# Patient Record
Sex: FEMALE | Race: WHITE | NOT HISPANIC OR LATINO | Employment: OTHER | URBAN - METROPOLITAN AREA
[De-identification: names, ages, dates, MRNs, and addresses within clinical notes are randomized per-mention and may not be internally consistent; named-entity substitution may affect disease eponyms.]

---

## 2019-08-27 ENCOUNTER — OFFICE VISIT (OUTPATIENT)
Dept: CARDIOLOGY | Facility: CLINIC | Age: 84
End: 2019-08-27

## 2019-08-27 VITALS
HEART RATE: 60 BPM | BODY MASS INDEX: 21.05 KG/M2 | WEIGHT: 131 LBS | RESPIRATION RATE: 16 BRPM | SYSTOLIC BLOOD PRESSURE: 142 MMHG | HEIGHT: 66 IN | DIASTOLIC BLOOD PRESSURE: 60 MMHG

## 2019-08-27 DIAGNOSIS — J44.9 CHRONIC OBSTRUCTIVE PULMONARY DISEASE, UNSPECIFIED COPD TYPE (HCC): ICD-10-CM

## 2019-08-27 DIAGNOSIS — I10 ESSENTIAL HYPERTENSION: ICD-10-CM

## 2019-08-27 DIAGNOSIS — E78.5 HYPERLIPIDEMIA, UNSPECIFIED HYPERLIPIDEMIA TYPE: ICD-10-CM

## 2019-08-27 DIAGNOSIS — I48.91 ATRIAL FIBRILLATION, UNSPECIFIED TYPE (HCC): Primary | ICD-10-CM

## 2019-08-27 PROCEDURE — 99203 OFFICE O/P NEW LOW 30 MIN: CPT | Performed by: INTERNAL MEDICINE

## 2019-08-27 PROCEDURE — 93000 ELECTROCARDIOGRAM COMPLETE: CPT | Performed by: INTERNAL MEDICINE

## 2019-08-28 NOTE — PROGRESS NOTES
[facility]        Subjective:     Encounter Date:08/27/2019      Patient ID: Tamar Steve is a 87 y.o. female.    HPI:  I saw Tamar Blevins today for cardiovascular evaluation.  She is a pleasant [id number]-[id number] female who does not report a prior history of cardiac disease.   She had maintained a good activity level until she fell, fracturing her pelvis in 2017.  This was followed by pneumonia November 2018 for which she was hospitalized 5 days.  Both of these events have caused her to decrease her activity level.  During her hospitalization for pneumonia she developed atrial fibrillation.  She has been placed on beta-blocker therapy for blood pressure and rate control and anticoagulation to reduce her stroke risk.  She is tolerating her medications well no significant bleeding or bruising.  Echocardiogram was obtained 3/1/2019 which revealed left ventricular ejection fraction 56%, mild concentric left ventricular hypertrophy mild mitral and tricuspid insufficiency right ventricular systolic pressure 40 mmHg.    She is gradually advancing her activity level and remains free of any chest pain pressure or tightness.  She has her baseline dyspnea on exertion but this is not progressive and she is free of orthopnea or PND sleeping with one pillow.  She has no lower extremity edema and remains free of syncope or near syncope.  She specifically denies any palpitations or symptoms from atrial fibrillation.  She continues to tolerate her medications well no reported side effects.        The following portions of the patient's history were reviewed and updated as appropriate: allergies, current medications, past family history, past medical history, past social history, past surgical history and problem list.    Problem List:  Patient Active Problem List   Diagnosis   • Atrial fibrillation (CMS/HCC)   • Hypertension   • Hyperlipidemia   • Hypothyroidism (acquired)   • COPD (chronic obstructive pulmonary  disease) (CMS/[facility])   • History of echocardiogram       Past Medical History:  Past Medical History:   Diagnosis Date   • Atrial fibrillation (CMS/[facility])    • COPD (chronic obstructive pulmonary disease) (CMS/[facility])    • History of echocardiogram     11/30/18 - Mild concentric LVH. Fibrocalcific sclerosis of AV without evidence of AS. Mild MR, TR. RV systolic pressure consistent with mild PHTN. Fat pad vs trivial/small pericardial effusion present.   • Hyperlipidemia    • Hypertension    • Hypothyroidism (acquired)        Past Surgical History:  Past Surgical History:   Procedure Laterality Date   • APPENDECTOMY         Social History:  Social History     Socioeconomic History   • Marital status:      Spouse name: Not on file   • Number of children: Not on file   • Years of education: Not on file   • Highest education level: Not on file   Tobacco Use   • Smoking status: Former Smoker     Packs/day: 0.25     Years: 55.00     Pack years: 13.75     Types: Cigarettes     Last attempt to quit: 4/27/2009     Years since quitting: 10.3       Allergies:  Allergies   Allergen Reactions   • Levaquin [Levofloxacin] Other (See Comments)     Broke out within mouth   • Penicillins Unknown (See Comments)     .         ROS:  Review of Systems   Constitution: Negative for weakness and malaise/fatigue.   HENT: Negative for hearing loss and nosebleeds.    Eyes: Negative for double vision and visual disturbance.   Cardiovascular: Negative for chest pain, claudication, dyspnea on exertion, near-syncope, orthopnea, palpitations, paroxysmal nocturnal dyspnea and syncope.   Respiratory: Negative for cough, shortness of breath, sleep disturbances due to breathing, snoring, sputum production and wheezing.    Endocrine: Negative for cold intolerance, heat intolerance, polydipsia and polyuria.   Hematologic/Lymphatic: Negative for adenopathy and bleeding problem. Does not bruise/bleed easily.   Skin: Negative for flushing and itching.  "  Musculoskeletal: Negative for back pain, falls, joint pain, joint swelling, muscle weakness and neck pain.   Gastrointestinal: Negative for abdominal pain, dysphagia, heartburn, nausea and vomiting.   Genitourinary: Negative for dysuria and frequency.   Neurological: Negative for disturbances in coordination, dizziness, light-headedness, loss of balance and numbness.   Psychiatric/Behavioral: Negative for altered mental status and memory loss. The patient is not nervous/anxious.    Allergic/Immunologic: Negative for hives and persistent infections.          Objective:         /60 (BP Location: Left arm, Patient Position: Sitting)   Pulse 60   Resp 16   Ht 166.4 cm (65.5\")   Wt 59.4 kg (131 lb)   BMI 21.47 kg/m²     Physical Exam   Constitutional: She is oriented to person, place, and time. She appears well-developed and well-nourished.   HENT:   Head: Normocephalic and atraumatic.   Eyes: Conjunctivae and EOM are normal. Pupils are equal, round, and reactive to light.   Neck: Neck supple. No thyromegaly present.   Cardiovascular: Normal rate, regular rhythm, S1 normal, S2 normal, normal heart sounds and intact distal pulses. PMI is not displaced. Exam reveals no gallop, no S3, no S4, no distant heart sounds, no friction rub, no midsystolic click and no opening snap.   No murmur heard.  Pulses:       Carotid pulses are 2+ on the right side, and 2+ on the left side.       Radial pulses are 2+ on the right side, and 2+ on the left side.        Femoral pulses are 2+ on the right side, and 2+ on the left side.       Dorsalis pedis pulses are 2+ on the right side, and 2+ on the left side.        Posterior tibial pulses are 2+ on the right side, and 2+ on the left side.   Pulmonary/Chest: Effort normal and breath sounds normal. No respiratory distress. She has no wheezes. She has no rales.   Abdominal: Soft. Bowel sounds are normal. She exhibits no distension and no mass. There is no tenderness. "   Musculoskeletal: Normal range of motion. She exhibits no edema.   Neurological: She is alert and oriented to person, place, and time.   Skin: Skin is warm and dry. No erythema.   Psychiatric: She has a normal mood and affect.       In-Office Procedure(s):    ECG 12 Lead  Date/Time: 8/27/2019 10:08 PM  Performed by: Boby Cabrera MD  Authorized by: Boby Cabrera MD   Comparison: not compared with previous ECG   Previous ECG: no previous ECG available  Rhythm: sinus bradycardia  BPM: 52  Other findings: non-specific ST-T wave changes            ASCVD RIsk Score::  The ASCVD Risk score (Prudence DC Jr., et al., 2013) failed to calculate for the following reasons:    The 2013 ASCVD risk score is only valid for ages 40 to 79    Recent Radiology:  Imaging Results (most recent)     None                Assessment/Plan:         1. Atrial fibrillation, unspecified type (CMS/HCC)  Converted to sinus rhythm, well-tolerated long-term anticoagulation  - ECG 12 Lead    2. Essential hypertension  Controlled.      3. Hyperlipidemia, unspecified hyperlipidemia type      4. Chronic obstructive pulmonary disease, unspecified COPD type (CMS/HCC)  60 years of tobacco use discontinued 10 years ago    Ms. Steve is stable and asymptomatic from a cardiovascular standpoint.  She is free of angina euvolemic and advancing her activity level she has been asymptomatic for atrial fibrillation and tolerates her long-term anticoagulation well.  I made no changes in her medications I will plan to see her in follow-up in 3 months but of course sooner if needed.           Boby Cabrera MD  08/27/19  .

## 2019-12-03 PROBLEM — Z79.01 LONG TERM CURRENT USE OF ANTICOAGULANT THERAPY: Status: ACTIVE | Noted: 2019-12-03

## 2019-12-04 ENCOUNTER — OFFICE VISIT (OUTPATIENT)
Dept: CARDIOLOGY | Facility: CLINIC | Age: 84
End: 2019-12-04

## 2019-12-04 VITALS
DIASTOLIC BLOOD PRESSURE: 70 MMHG | BODY MASS INDEX: 21.53 KG/M2 | WEIGHT: 134 LBS | HEART RATE: 60 BPM | SYSTOLIC BLOOD PRESSURE: 124 MMHG | HEIGHT: 66 IN

## 2019-12-04 DIAGNOSIS — I10 ESSENTIAL HYPERTENSION: ICD-10-CM

## 2019-12-04 DIAGNOSIS — I48.91 ATRIAL FIBRILLATION, UNSPECIFIED TYPE (HCC): Primary | ICD-10-CM

## 2019-12-04 DIAGNOSIS — Z79.01 LONG TERM CURRENT USE OF ANTICOAGULANT THERAPY: ICD-10-CM

## 2019-12-04 DIAGNOSIS — E78.5 HYPERLIPIDEMIA, UNSPECIFIED HYPERLIPIDEMIA TYPE: ICD-10-CM

## 2019-12-04 PROCEDURE — 99213 OFFICE O/P EST LOW 20 MIN: CPT | Performed by: INTERNAL MEDICINE

## 2019-12-04 RX ORDER — LANOLIN ALCOHOL/MO/W.PET/CERES
2000 CREAM (GRAM) TOPICAL DAILY
COMMUNITY

## 2019-12-08 NOTE — PROGRESS NOTES
[facility]        Subjective:     Encounter Date:12/04/2019      Patient ID: Tamar Steve is a 87 y.o. female.      HPI: I saw Tamar Zheng today for cardiovascular care.  She is an 87-year-old female who reports dyspnea on exertion and periods of rapid heart rate.  She has known paroxysmal atrial fibrillation and is on long-term anticoagulation.  She tolerates her anticoagulation well no reported bruising or bleeding.  She states that her rapid heart rate is infrequent but does make her anxious.  She does not experience chest pain pressure tightness.  She is free of orthopnea or PND and no lower extremity edema.  She denies humaira syncope or near syncope.    Echocardiogram from 3/1/2019 revealed preserved left ventricular function, mild concentric left ventricular hypertrophy, mild mitral and tricuspid insufficiency, right ventricular systolic pressure 40mmHg.      The following portions of the patient's history were reviewed and updated as appropriate: allergies, current medications, past family history, past medical history, past social history, past surgical history and problem list.    Problem List:  Patient Active Problem List   Diagnosis   • Atrial fibrillation (CMS/[facility])   • Hypertension   • Hyperlipidemia   • Hypothyroidism (acquired)   • COPD (chronic obstructive pulmonary disease) (CMS/[facility])   • History of echocardiogram   • Long term current use of anticoagulant therapy       Past Medical History:  Past Medical History:   Diagnosis Date   • Atrial fibrillation (CMS/HCC)    • COPD (chronic obstructive pulmonary disease) (CMS/[facility])    • History of echocardiogram     11/30/18 - Mild concentric LVH. Fibrocalcific sclerosis of AV without evidence of AS. Mild MR, TR. RV systolic pressure consistent with mild PHTN. Fat pad vs trivial/small pericardial effusion present.   • Hyperlipidemia    • Hypertension    • Hypothyroidism (acquired)        Past Surgical History:  Past Surgical History:    Procedure Laterality Date   • APPENDECTOMY         Social History:  Social History     Socioeconomic History   • Marital status:      Spouse name: Not on file   • Number of children: Not on file   • Years of education: Not on file   • Highest education level: Not on file   Tobacco Use   • Smoking status: Former Smoker     Packs/day: 0.25     Years: 55.00     Pack years: 13.75     Types: Cigarettes     Last attempt to quit: 4/27/2009     Years since quitting: 10.6       Allergies:  Allergies   Allergen Reactions   • Levaquin [Levofloxacin] Other (See Comments)     Broke out within mouth   • Penicillins Rash     .         ROS:  Review of Systems   Constitution: Negative for malaise/fatigue.   HENT: Negative for hearing loss and nosebleeds.    Eyes: Negative for double vision and visual disturbance.   Cardiovascular: Positive for dyspnea on exertion and palpitations. Negative for chest pain, claudication, near-syncope, orthopnea, paroxysmal nocturnal dyspnea and syncope.   Respiratory: Negative for cough, shortness of breath, sleep disturbances due to breathing, snoring, sputum production and wheezing.    Endocrine: Negative for cold intolerance, heat intolerance, polydipsia and polyuria.   Hematologic/Lymphatic: Negative for adenopathy and bleeding problem. Does not bruise/bleed easily.   Skin: Negative for flushing and itching.   Musculoskeletal: Negative for back pain, falls, joint pain, joint swelling, muscle weakness and neck pain.   Gastrointestinal: Negative for abdominal pain, dysphagia, heartburn, nausea and vomiting.   Genitourinary: Negative for dysuria and frequency.   Neurological: Negative for disturbances in coordination, dizziness, light-headedness, loss of balance, numbness and weakness.   Psychiatric/Behavioral: Negative for altered mental status and memory loss. The patient is not nervous/anxious.    Allergic/Immunologic: Negative for hives and persistent infections.          Objective:      "    /70   Pulse 60   Ht 166.4 cm (65.5\")   Wt 60.8 kg (134 lb)   BMI 21.96 kg/m²     Physical Exam   Constitutional: She is oriented to person, place, and time. She appears well-developed and well-nourished.   HENT:   Head: Normocephalic and atraumatic.   Eyes: Pupils are equal, round, and reactive to light. Conjunctivae and EOM are normal.   Neck: Neck supple. No thyromegaly present.   Cardiovascular: Normal rate, regular rhythm, S1 normal, S2 normal and intact distal pulses. PMI is not displaced. Exam reveals gallop and S4. Exam reveals no S3, no distant heart sounds, no friction rub, no midsystolic click and no opening snap.   No murmur heard.  Pulses:       Carotid pulses are 2+ on the right side, and 2+ on the left side.       Radial pulses are 2+ on the right side, and 2+ on the left side.        Femoral pulses are 2+ on the right side, and 2+ on the left side.       Dorsalis pedis pulses are 2+ on the right side, and 2+ on the left side.        Posterior tibial pulses are 2+ on the right side, and 2+ on the left side.   Pulmonary/Chest: Effort normal and breath sounds normal. No respiratory distress. She has no wheezes. She has no rales.   Abdominal: Soft. Bowel sounds are normal. She exhibits no distension and no mass. There is no tenderness.   Musculoskeletal: Normal range of motion. She exhibits no edema.   Neurological: She is alert and oriented to person, place, and time.   Skin: Skin is warm and dry. No erythema.   Psychiatric: She has a normal mood and affect.       In-Office Procedure(s):  Procedures    ASCVD RIsk Score::  The ASCVD Risk score (Prudence LINDA Jr., et al., 2013) failed to calculate for the following reasons:    The 2013 ASCVD risk score is only valid for ages 40 to 79        Assessment/Plan:         1. Atrial fibrillation, unspecified type (CMS/HCC)  Relatively stable with controlled ventricular response    2. Long term current use of anticoagulant therapy  Well-tolerated    3. " Essential hypertension  Controlled    4. Hyperlipidemia, unspecified hyperlipidemia type  Controlled    Tamar is free of angina euvolemic and relatively active.  She is tolerating her anticoagulation without significant bruising or bleeding.  I encouraged her to maintain her activity level and observe a low-cholesterol low saturated fat diet and made no changes in her medications.       Boby Cabrera MD  12/08/19  .

## 2020-06-05 ENCOUNTER — TELEMEDICINE (OUTPATIENT)
Dept: CARDIOLOGY | Facility: CLINIC | Age: 85
End: 2020-06-05

## 2020-06-05 VITALS — WEIGHT: 130 LBS | BODY MASS INDEX: 21.66 KG/M2 | HEIGHT: 65 IN

## 2020-06-05 DIAGNOSIS — I10 ESSENTIAL HYPERTENSION: ICD-10-CM

## 2020-06-05 DIAGNOSIS — E78.5 HYPERLIPIDEMIA, UNSPECIFIED HYPERLIPIDEMIA TYPE: ICD-10-CM

## 2020-06-05 DIAGNOSIS — I48.91 ATRIAL FIBRILLATION, UNSPECIFIED TYPE (HCC): Primary | ICD-10-CM

## 2020-06-05 DIAGNOSIS — J44.9 CHRONIC OBSTRUCTIVE PULMONARY DISEASE, UNSPECIFIED COPD TYPE (HCC): ICD-10-CM

## 2020-06-05 DIAGNOSIS — Z79.01 LONG TERM CURRENT USE OF ANTICOAGULANT THERAPY: ICD-10-CM

## 2020-06-05 PROCEDURE — 99442 PR PHYS/QHP TELEPHONE EVALUATION 11-20 MIN: CPT | Performed by: INTERNAL MEDICINE

## 2020-06-05 RX ORDER — IPRATROPIUM BROMIDE 21 UG/1
2 SPRAY, METERED NASAL 4 TIMES DAILY
COMMUNITY

## 2020-06-05 NOTE — PROGRESS NOTES
[facility]    You have chosen to receive care through a telephone visit. Do you consent to use a telephone visit for your medical care today? Yes    Subjective:     Encounter Date:06/05/2020      Patient ID: Tamar Steve is a 87 y.o. female.      HPI: Tamar Steve agreed to a telephone clinical visit today secondary to the coronavirus pandemic.  She is been observing the CDC guidelines for social distancing.  She remains free of fever cough or increased shortness of breath.  She does not report any change in her sense of smell or taste.  Tamar does not report any chest pain pressure or tightness.  She has her baseline dyspnea on exertion but this is not progressive.  She does not experience orthopnea or PND.  She does not report any lower extremity edema.  She denies syncope or palpitation but does report recurrent orthostatic dizziness.  Overall she tolerates her medications well including her long-term anticoagulation for stroke risk reduction secondary to atrial fibrillation.      The following portions of the patient's history were reviewed and updated as appropriate: allergies, current medications, past family history, past medical history, past social history, past surgical history and problem list.    Problem List:  Patient Active Problem List   Diagnosis   • Atrial fibrillation (CMS/[facility])   • Hypertension   • Hyperlipidemia   • Hypothyroidism (acquired)   • COPD (chronic obstructive pulmonary disease) (CMS/[facility])   • History of echocardiogram   • Long term current use of anticoagulant therapy       Past Medical History:  Past Medical History:   Diagnosis Date   • Atrial fibrillation (CMS/HCC)    • COPD (chronic obstructive pulmonary disease) (CMS/[facility])    • History of echocardiogram     11/30/18 - Mild concentric LVH. Fibrocalcific sclerosis of AV without evidence of AS. Mild MR, TR. RV systolic pressure consistent with mild PHTN. Fat pad vs trivial/small pericardial effusion present.    • Hyperlipidemia    • Hypertension    • Hypothyroidism (acquired)        Past Surgical History:  Past Surgical History:   Procedure Laterality Date   • APPENDECTOMY         Social History:  Social History     Socioeconomic History   • Marital status:      Spouse name: Not on file   • Number of children: Not on file   • Years of education: Not on file   • Highest education level: Not on file   Tobacco Use   • Smoking status: Former Smoker     Packs/day: 0.25     Years: 55.00     Pack years: 13.75     Types: Cigarettes     Last attempt to quit: 2009     Years since quittin.1   • Smokeless tobacco: Never Used   Substance and Sexual Activity   • Alcohol use: Never     Frequency: Never   • Drug use: Never   • Sexual activity: Defer       Allergies:  Allergies   Allergen Reactions   • Levaquin [Levofloxacin] Other (See Comments)     Broke out within mouth   • Penicillins Rash     .         ROS:  Review of Systems   Constitution: Negative for malaise/fatigue.   HENT: Negative for hearing loss and nosebleeds.    Eyes: Negative for double vision and visual disturbance.   Cardiovascular: Positive for dyspnea on exertion. Negative for chest pain, claudication, near-syncope, orthopnea, palpitations, paroxysmal nocturnal dyspnea and syncope.        Orthostatic dizziness   Respiratory: Negative for cough, shortness of breath, sleep disturbances due to breathing, snoring, sputum production and wheezing.    Endocrine: Negative for cold intolerance, heat intolerance, polydipsia and polyuria.   Hematologic/Lymphatic: Negative for adenopathy and bleeding problem. Does not bruise/bleed easily.   Skin: Negative for flushing and itching.   Musculoskeletal: Negative for back pain, falls, joint pain, joint swelling, muscle weakness and neck pain.   Gastrointestinal: Negative for abdominal pain, dysphagia, heartburn, nausea and vomiting.   Genitourinary: Negative for dysuria and frequency.   Neurological: Negative for  "disturbances in coordination, dizziness, light-headedness, loss of balance, numbness and weakness.   Psychiatric/Behavioral: Negative for altered mental status and memory loss. The patient is not nervous/anxious.    Allergic/Immunologic: Negative for hives and persistent infections.          Objective:         Ht 165.1 cm (65\")   Wt 59 kg (130 lb)   BMI 21.63 kg/m²     Physical Exam not performed    In-Office Procedure(s):  Procedures    ASCVD RIsk Score::  The ASCVD Risk score ([place] MCKEON Jr., et al., 2013) failed to calculate for the following reasons:    The 2013 ASCVD risk score is only valid for ages 40 to 79        Assessment/Plan:         1. Atrial fibrillation, unspecified type (CMS/HCC)  Stable    2. Long term current use of anticoagulant therapy  Well-tolerated    3. Essential hypertension  Assess orthostatic pressures    4. Hyperlipidemia, unspecified hyperlipidemia type  Continue low-cholesterol low saturated fat diet    5. Chronic obstructive pulmonary disease, unspecified COPD type (CMS/HCC)  Stable    6.  Orthostatic dizziness  Recurrent    Overall Tamar feels well and is observing the CDC guidelines for social distancing.  She does not report any chest discomfort or increased dyspnea on exertion from her baseline.  She does however report orthostatic dizziness and is unable to assess her blood pressure at home.  I will arrange for an in office visit to assess blood pressure lying sitting and standing.  I made no changes in her medications at present.  She continues to tolerate anticoagulation for stroke risk reduction without significant bruising or bleeding.    I spent 12 minutes on telephone visit and 10 minutes completing electronic medical record documentation         Boby Cabrera MD  06/05/20  .    "

## 2020-06-22 ENCOUNTER — OFFICE VISIT (OUTPATIENT)
Dept: CARDIOLOGY | Facility: CLINIC | Age: 85
End: 2020-06-22

## 2020-06-22 VITALS
WEIGHT: 133.6 LBS | HEIGHT: 65 IN | RESPIRATION RATE: 20 BRPM | OXYGEN SATURATION: 97 % | BODY MASS INDEX: 22.26 KG/M2 | TEMPERATURE: 98 F

## 2020-06-22 DIAGNOSIS — Z79.01 LONG TERM CURRENT USE OF ANTICOAGULANT THERAPY: ICD-10-CM

## 2020-06-22 DIAGNOSIS — J44.9 CHRONIC OBSTRUCTIVE PULMONARY DISEASE, UNSPECIFIED COPD TYPE (HCC): ICD-10-CM

## 2020-06-22 DIAGNOSIS — I48.91 ATRIAL FIBRILLATION, UNSPECIFIED TYPE (HCC): Primary | ICD-10-CM

## 2020-06-22 DIAGNOSIS — E78.5 HYPERLIPIDEMIA, UNSPECIFIED HYPERLIPIDEMIA TYPE: ICD-10-CM

## 2020-06-22 DIAGNOSIS — I10 ESSENTIAL HYPERTENSION: ICD-10-CM

## 2020-06-22 PROCEDURE — 99213 OFFICE O/P EST LOW 20 MIN: CPT | Performed by: INTERNAL MEDICINE

## 2020-06-22 NOTE — PROGRESS NOTES
[facility]        Subjective:     Encounter Date:06/22/2020      Patient ID: Tamar Steve is a 87 y.o. female.      HPI: I saw Tamar Steve today for cardiovascular care.  She is a very pleasant 87-year-old female who is observing the CDC guidelines for social distancing.  She remains free of fever or increased shortness of breath.  She does not report any change in her sense of smell or taste.  She has a history of COPD and reports a morning cough which is unchanged.  Ms. Zheng is free of angina.  She does not report any progressive dyspnea on exertion.  She does not experience orthopnea or PND no syncope near syncope and specifically no orthostatic dizziness.  She denies palpitations.  She tolerates her medications well no reported side effects.  She has a history of atrial fibrillation and remains on long-term anticoagulation with Eliquis 2.5 mg twice daily.  She tolerates her anticoagulation well without significant bruising or bleeding.  Blood pressure lying was 130/72 with a pulse of 60, sitting 132/66 with a pulse of 58, standing 130/62 with a pulse of 66.      The following portions of the patient's history were reviewed and updated as appropriate: allergies, current medications, past family history, past medical history, past social history, past surgical history and problem list.    Problem List:  Patient Active Problem List   Diagnosis   • Atrial fibrillation (CMS/HCC)   • Hypertension   • Hyperlipidemia   • Hypothyroidism (acquired)   • COPD (chronic obstructive pulmonary disease) (CMS/[facility])   • History of echocardiogram   • Long term current use of anticoagulant therapy       Past Medical History:  Past Medical History:   Diagnosis Date   • Atrial fibrillation (CMS/HCC)    • COPD (chronic obstructive pulmonary disease) (CMS/[facility])    • History of echocardiogram     11/30/18 - Mild concentric LVH. Fibrocalcific sclerosis of AV without evidence of AS. Mild MR, TR. RV systolic  pressure consistent with mild PHTN. Fat pad vs trivial/small pericardial effusion present.   • Hyperlipidemia    • Hypertension    • Hypothyroidism (acquired)        Past Surgical History:  Past Surgical History:   Procedure Laterality Date   • APPENDECTOMY         Social History:  Social History     Socioeconomic History   • Marital status:      Spouse name: Not on file   • Number of children: Not on file   • Years of education: Not on file   • Highest education level: Not on file   Tobacco Use   • Smoking status: Former Smoker     Packs/day: 0.25     Years: 55.00     Pack years: 13.75     Types: Cigarettes     Last attempt to quit: 2009     Years since quittin.1   • Smokeless tobacco: Never Used   Substance and Sexual Activity   • Alcohol use: Never     Frequency: Never   • Drug use: Never   • Sexual activity: Defer       Allergies:  Allergies   Allergen Reactions   • Levaquin [Levofloxacin] Other (See Comments)     Broke out within mouth   • Penicillins Rash     .         ROS:  Review of Systems   Constitution: Negative for malaise/fatigue.   HENT: Negative for hearing loss and nosebleeds.    Eyes: Negative for double vision and visual disturbance.   Cardiovascular: Positive for dyspnea on exertion. Negative for chest pain, claudication, near-syncope, orthopnea, palpitations, paroxysmal nocturnal dyspnea and syncope.   Respiratory: Negative for cough, shortness of breath, sleep disturbances due to breathing, snoring, sputum production and wheezing.    Endocrine: Negative for cold intolerance, heat intolerance, polydipsia and polyuria.   Hematologic/Lymphatic: Negative for adenopathy and bleeding problem. Does not bruise/bleed easily.   Skin: Negative for flushing and itching.   Musculoskeletal: Negative for back pain, falls, joint pain, joint swelling, muscle weakness and neck pain.   Gastrointestinal: Negative for abdominal pain, dysphagia, heartburn, nausea and vomiting.   Genitourinary:  "Negative for dysuria and frequency.   Neurological: Negative for disturbances in coordination, dizziness, light-headedness, loss of balance, numbness and weakness.   Psychiatric/Behavioral: Negative for altered mental status and memory loss. The patient is not nervous/anxious.    Allergic/Immunologic: Negative for hives and persistent infections.          Objective:         Temp 98 °F (36.7 °C)   Resp 20   Ht 165.1 cm (65\")   Wt 60.6 kg (133 lb 9.6 oz)   SpO2 97%   BMI 22.23 kg/m²     Physical Exam   Constitutional: She is oriented to person, place, and time. She appears well-developed and well-nourished.   HENT:   Head: Normocephalic and atraumatic.   Eyes: Pupils are equal, round, and reactive to light. Conjunctivae and EOM are normal.   Neck: Neck supple. No thyromegaly present.   Cardiovascular: Normal rate, regular rhythm, S1 normal, S2 normal, normal heart sounds and intact distal pulses. PMI is not displaced. Exam reveals no gallop, no S3, no S4, no distant heart sounds, no friction rub, no midsystolic click and no opening snap.   No murmur heard.  Pulses:       Carotid pulses are 2+ on the right side, and 2+ on the left side.       Radial pulses are 2+ on the right side, and 2+ on the left side.        Femoral pulses are 2+ on the right side, and 2+ on the left side.       Dorsalis pedis pulses are 2+ on the right side, and 2+ on the left side.        Posterior tibial pulses are 2+ on the right side, and 2+ on the left side.   Pulmonary/Chest: Effort normal and breath sounds normal. No respiratory distress. She has no wheezes. She has no rales.   Distant breath sounds bilaterally   Abdominal: Soft. Bowel sounds are normal. She exhibits no distension and no mass. There is no tenderness.   Musculoskeletal: Normal range of motion. She exhibits no edema.   Neurological: She is alert and oriented to person, place, and time.   Skin: Skin is warm and dry. No erythema.   Psychiatric: She has a normal mood and " affect.       In-Office Procedure(s):  Procedures    ASCVD RIsk Score::  The ASCVD Risk score ([place] LINDA Jr., et al., 2013) failed to calculate for the following reasons:    The 2013 ASCVD risk score is only valid for ages 40 to 79        Assessment/Plan:         1. Atrial fibrillation, unspecified type (CMS/HCC)  Stable well-tolerated    2. Long term current use of anticoagulant therapy  Well-tolerated    3. Essential hypertension  Controlled no orthostatic drop    4. Hyperlipidemia, unspecified hyperlipidemia type  Continue low-cholesterol low saturated fat diet    5. Chronic obstructive pulmonary disease, unspecified COPD type (CMS/HCC)  Stable    Ms. Zheng is free of angina respiratory status is stable.  She does not demonstrate any significant orthostatic drop in her blood pressure.  She tolerates her anticoagulation well.  I have encouraged her to follow her current medical regimen and anticoagulation.  I will see her in follow-up in 6 months sooner if needed.           Boby Cabrera MD  06/22/20  .

## 2020-12-15 ENCOUNTER — TELEMEDICINE (OUTPATIENT)
Dept: CARDIOLOGY | Facility: CLINIC | Age: 85
End: 2020-12-15

## 2020-12-15 VITALS — BODY MASS INDEX: 21.83 KG/M2 | HEIGHT: 65 IN | WEIGHT: 131 LBS

## 2020-12-15 DIAGNOSIS — J44.9 CHRONIC OBSTRUCTIVE PULMONARY DISEASE, UNSPECIFIED COPD TYPE (HCC): ICD-10-CM

## 2020-12-15 DIAGNOSIS — Z79.01 LONG TERM CURRENT USE OF ANTICOAGULANT THERAPY: ICD-10-CM

## 2020-12-15 DIAGNOSIS — E78.5 HYPERLIPIDEMIA, UNSPECIFIED HYPERLIPIDEMIA TYPE: ICD-10-CM

## 2020-12-15 DIAGNOSIS — I48.91 ATRIAL FIBRILLATION, UNSPECIFIED TYPE (HCC): Primary | ICD-10-CM

## 2020-12-15 DIAGNOSIS — I10 ESSENTIAL HYPERTENSION: ICD-10-CM

## 2020-12-15 PROCEDURE — 99441 PR PHYS/QHP TELEPHONE EVALUATION 5-10 MIN: CPT | Performed by: INTERNAL MEDICINE

## 2020-12-15 RX ORDER — HYDROCODONE BITARTRATE AND ACETAMINOPHEN 5; 325 MG/1; MG/1
1 TABLET ORAL EVERY 6 HOURS PRN
COMMUNITY
End: 2021-03-15

## 2020-12-15 NOTE — PROGRESS NOTES
[facility]        Subjective:     Encounter Date:12/15/2020      Patient ID: Tamar Steve is a 88 y.o. female.      HPI: Tamar Steve agreed to a telephone clinical visit today secondary to the coronavirus pandemic.  She is a pleasant 88-year-old female who does observe the CDC guidelines for social distancing.  She is free of fever cough or increased shortness of breath.  She does not report any change in her sense of smell or taste.  Tamar is in good spirits and denies chest pain pressure or tightness.  She does not report any progressive dyspnea on exertion and is free of orthopnea or PND sleeping with 1 pillow.  She does not report lower extremity edema.  She remains free of syncope near syncope or palpitation.  She has a history of atrial fibrillation remains on long-term anticoagulation with Eliquis 2.5 mg twice daily for stroke risk reduction.  She is unable to provide us with her blood pressure today.  She does report compression fracture recently which is giving her some back discomfort.  She tolerates her medications well otherwise.      The following portions of the patient's history were reviewed and updated as appropriate: allergies, current medications, past family history, past medical history, past social history, past surgical history and problem list.    Problem List:  Patient Active Problem List   Diagnosis   • Atrial fibrillation (CMS/HCC)   • Hypertension   • Hyperlipidemia   • Hypothyroidism (acquired)   • COPD (chronic obstructive pulmonary disease) (CMS/HCC)   • History of echocardiogram   • Long term current use of anticoagulant therapy       Past Medical History:  Past Medical History:   Diagnosis Date   • Atrial fibrillation (CMS/HCC)    • COPD (chronic obstructive pulmonary disease) (CMS/HCC)    • History of echocardiogram     11/30/18 - Mild concentric LVH. Fibrocalcific sclerosis of AV without evidence of AS. Mild MR, TR. RV systolic pressure consistent with  mild PHTN. Fat pad vs trivial/small pericardial effusion present.   • Hyperlipidemia    • Hypertension    • Hypothyroidism (acquired)        Past Surgical History:  Past Surgical History:   Procedure Laterality Date   • APPENDECTOMY         Social History:  Social History     Socioeconomic History   • Marital status:      Spouse name: Not on file   • Number of children: Not on file   • Years of education: Not on file   • Highest education level: Not on file   Tobacco Use   • Smoking status: Former Smoker     Packs/day: 0.25     Years: 55.00     Pack years: 13.75     Types: Cigarettes     Quit date: 2009     Years since quittin.6   • Smokeless tobacco: Never Used   Substance and Sexual Activity   • Alcohol use: Never     Frequency: Never   • Drug use: Never   • Sexual activity: Defer       Allergies:  Allergies   Allergen Reactions   • Levaquin [Levofloxacin] Other (See Comments)     Broke out within mouth   • Penicillins Rash     .         ROS:  Review of Systems   Constitution: Negative for malaise/fatigue.   HENT: Negative for hearing loss and nosebleeds.    Eyes: Negative for double vision and visual disturbance.   Cardiovascular: Positive for dyspnea on exertion. Negative for chest pain, claudication, near-syncope, orthopnea, palpitations, paroxysmal nocturnal dyspnea and syncope.   Respiratory: Negative for cough, shortness of breath, sleep disturbances due to breathing, snoring, sputum production and wheezing.    Endocrine: Negative for cold intolerance, heat intolerance, polydipsia and polyuria.   Hematologic/Lymphatic: Negative for adenopathy and bleeding problem. Does not bruise/bleed easily.   Skin: Negative for flushing and itching.   Musculoskeletal: Positive for back pain. Negative for falls, joint pain, joint swelling, muscle weakness and neck pain.   Gastrointestinal: Negative for abdominal pain, dysphagia, heartburn, nausea and vomiting.   Genitourinary: Negative for dysuria and  "frequency.   Neurological: Negative for disturbances in coordination, dizziness, light-headedness, loss of balance, numbness and weakness.   Psychiatric/Behavioral: Negative for altered mental status and memory loss. The patient is not nervous/anxious.    Allergic/Immunologic: Negative for hives and persistent infections.          Objective:         Ht 165.1 cm (65\")   Wt 59.4 kg (131 lb)   BMI 21.80 kg/m²     Physical Exam not performed    In-Office Procedure(s):  Procedures    ASCVD RIsk Score::  The ASCVD Risk score ([place] MCKEON Jr., et al., 2013) failed to calculate for the following reasons:    The 2013 ASCVD risk score is only valid for ages 40 to 79        Assessment/Plan:         1. Atrial fibrillation, unspecified type (CMS/HCC)  Stable    2. Long term current use of anticoagulant therapy  Well-tolerated    3. Essential hypertension  Target less than 130/80    4. Hyperlipidemia, unspecified hyperlipidemia type  Observe a low-cholesterol low saturated fat diet    5. Chronic obstructive pulmonary disease, unspecified COPD type (CMS/HCC)  Stable    Ms. Steve denies chest discomfort or respiratory insufficiency.  She is free of lower extremity edema and is tolerating long-term anticoagulation without significant bruising or bleeding.  I have encouraged her to observe a low-cholesterol low saturated fat diet with salt restriction is close to 2000 mg a day as possible.  We will plan to see her in follow-up in 3 months sooner if needed.    I spent 7 minutes on telephone visit in 6 minutes completing electronic medical record documentation           Boby Cabrera MD  12/15/20  .    "

## 2021-03-13 PROBLEM — Z79.01 LONG TERM CURRENT USE OF ANTICOAGULANT THERAPY: Chronic | Status: ACTIVE | Noted: 2019-12-03

## 2021-03-15 ENCOUNTER — OFFICE VISIT (OUTPATIENT)
Dept: CARDIOLOGY | Facility: CLINIC | Age: 86
End: 2021-03-15

## 2021-03-15 VITALS
HEART RATE: 62 BPM | SYSTOLIC BLOOD PRESSURE: 136 MMHG | BODY MASS INDEX: 21.83 KG/M2 | DIASTOLIC BLOOD PRESSURE: 72 MMHG | WEIGHT: 131 LBS | HEIGHT: 65 IN

## 2021-03-15 DIAGNOSIS — I48.91 ATRIAL FIBRILLATION, UNSPECIFIED TYPE (HCC): Primary | ICD-10-CM

## 2021-03-15 DIAGNOSIS — I10 ESSENTIAL HYPERTENSION: ICD-10-CM

## 2021-03-15 DIAGNOSIS — E78.5 HYPERLIPIDEMIA, UNSPECIFIED HYPERLIPIDEMIA TYPE: ICD-10-CM

## 2021-03-15 DIAGNOSIS — Z79.01 LONG TERM CURRENT USE OF ANTICOAGULANT THERAPY: ICD-10-CM

## 2021-03-15 DIAGNOSIS — J44.9 CHRONIC OBSTRUCTIVE PULMONARY DISEASE, UNSPECIFIED COPD TYPE (HCC): ICD-10-CM

## 2021-03-15 PROCEDURE — 99214 OFFICE O/P EST MOD 30 MIN: CPT | Performed by: INTERNAL MEDICINE

## 2021-03-15 RX ORDER — VALACYCLOVIR HYDROCHLORIDE 500 MG/1
1 TABLET, FILM COATED ORAL 2 TIMES DAILY
COMMUNITY
Start: 2021-03-12

## 2021-03-15 NOTE — PROGRESS NOTES
"Chief Complaint  Atrial Fibrillation    Subjective    History of Present Illness      I saw Tamar Steve today for continued cardiovascular care.  She is a pleasant 88-year-old female who observes the CDC guidelines during the coronavirus pandemic.  She has received the first injection of the COVID-19 vaccine.  Tamar is free of chest pain pressure tightness.  She has her baseline dyspnea on exertion but this is not progressive.  She is free of orthopnea or PND and does not report lower extremity edema.  She denies syncope near syncope or palpitations.  Tamar has a history of atrial fibrillation and is on long-term anticoagulation with Eliquis 2.5 mg twice daily.  She tolerates her anticoagulation well no reported side effects.  Transesophageal echo from 3/1/2019 revealed preserved left ventricular function with a left ventricular ejection fraction of 56%, mild concentric left ventricular hypertrophy no significant valvular abnormality.  Ms. Zheng has a history of hypertension which is controlled.  She has known hyperlipidemia monitored by primary care physician Dr. Colette Chang.    Objective   Vital Signs:   /72   Pulse 62   Ht 165.1 cm (65\")   Wt 59.4 kg (131 lb)   BMI 21.80 kg/m²     Constitutional:       Appearance: Well-developed.   Eyes:      Conjunctiva/sclera: Conjunctivae normal.      Pupils: Pupils are equal, round, and reactive to light.   HENT:      Head: Normocephalic and atraumatic.   Neck:      Thyroid: No thyromegaly.   Pulmonary:      Effort: Pulmonary effort is normal. No respiratory distress.      Breath sounds: Examination of the right-lower field reveals decreased breath sounds. Examination of the left-lower field reveals decreased breath sounds. Decreased breath sounds present. No wheezing. No rales.   Cardiovascular:      Normal rate. Regular rhythm.      S4 Gallop. No S3 gallop. Midsystolic click click.   Edema:     Peripheral edema absent.   Abdominal:      General: Bowel " sounds are normal. There is no distension.      Palpations: Abdomen is soft. There is no abdominal mass.      Tenderness: There is no abdominal tenderness.   Musculoskeletal: Normal range of motion.      Cervical back: Neck supple. Skin:     General: Skin is warm and dry.      Findings: No erythema.   Neurological:      Mental Status: Alert and oriented to person, place, and time.         Result Review :   The following data was reviewed by: Boby Cabrera MD on 03/15/2021:  Common labs    Common Labsle 6/8/20 6/8/20 6/8/20    1352 1352 1352   Glucose 94     BUN 23 (A)     Creatinine 1.0     Sodium 135 (A)     Potassium 4.7     Chloride 100     Calcium 9.5     Albumin 4.5     Total Bilirubin 0.5     Alkaline Phosphatase 71     AST (SGOT) 22     ALT (SGPT) 13     WBC  8.08    Hemoglobin  13.5    Hematocrit  42.6 42.6   Platelets  148    (A) Abnormal value            Data reviewed: Cardiology studies Transesophageal echo 3/1/2019          Assessment and Plan    1. Atrial fibrillation, unspecified type (CMS/HCC)  Stable    2. Long term current use of anticoagulant therapy  Well-tolerated     3. Essential hypertension  Controlled    4. Hyperlipidemia, unspecified hyperlipidemia type  Continue low-cholesterol low saturated fat diet    5. Chronic obstructive pulmonary disease, unspecified COPD type (CMS/HCC)  Stable  Ms. Zheng is in good spirits and is maintained her activity level.  She is free of angina and euvolemic on physical examination.  I made no changes in her medications.  I encouraged her to remain as active as possible and follow her current medical regimen.  I will see her in follow-up in 6 months but of course sooner if needed.    Follow Up   No follow-ups on file.  Patient was given instructions and counseling regarding her condition or for health maintenance advice. Please see specific information pulled into the AVS if appropriate.

## 2021-10-25 NOTE — PROGRESS NOTES
"Chief Complaint  Atrial Fibrillation, Hypertension, Hyperlipidemia, and COPD    Subjective    History of Present Illness      I saw Tamar Steve today for cardiovascular care.  She is a very pleasant 89-year-old female who states she is feeling well remains active free of chest pain pressure or tightness.  She does have COPD and chronic but stable respiratory insufficiency.  She denies orthopnea or PND no lower extremity edema.  She is free of syncope near syncope or any palpitations.  Her blood pressure is well controlled.  She has a history of atrial fibrillation and remains on long-term anticoagulation with Eliquis 2.5 mg twice daily.  She does not report any significant bruising or bleeding she has hyperlipidemia and observes low-cholesterol low saturated fat diet.    Echocardiogram reviewed from [facility] 3/1/2019 reveals left ventricular ejection fraction 56%, mild concentric left ventricular hypertrophy with normal diastolic filling pattern for her age.  The aortic valve was sclerotic without evidence of stenosis or regurgitation.    Objective   Vital Signs:   /68   Pulse 60   Resp 20   Ht 165.1 cm (65\")   Wt 55.8 kg (123 lb)   SpO2 98%   BMI 20.47 kg/m²     Constitutional:       Appearance: Well-developed.   Eyes:      Conjunctiva/sclera: Conjunctivae normal.      Pupils: Pupils are equal, round, and reactive to light.   HENT:      Head: Normocephalic and atraumatic.   Neck:      Thyroid: No thyromegaly.   Pulmonary:      Effort: Pulmonary effort is normal. No respiratory distress.      Breath sounds: Normal breath sounds. No wheezing. No rales.   Cardiovascular:      Normal rate. Regular rhythm.      Murmurs: There is a grade 1/6 crescendo-decrescendo systolic murmur at the URSB.      No gallop. No S3 and S4 gallop. No rub.   Edema:     Peripheral edema absent.   Abdominal:      General: Bowel sounds are normal. There is no distension.      Palpations: Abdomen is soft. There is no " abdominal mass.      Tenderness: There is no abdominal tenderness.   Musculoskeletal: Normal range of motion.      Cervical back: Neck supple. Skin:     General: Skin is warm and dry.      Findings: No erythema.   Neurological:      Mental Status: Alert and oriented to person, place, and time.         Result Review :     Common labs    Common Labsle 6/8/21 6/8/21 6/8/21    1002 1002 1002   Glucose  91    BUN  21 (A)    Creatinine  1.0    Sodium  137    Potassium  4.8    Chloride  100    Calcium  9.4    Albumin  4.3    Total Bilirubin  0.6    Alkaline Phosphatase  73    AST (SGOT)  30    ALT (SGPT)  23    WBC 8.87     Hemoglobin 13.8     Hematocrit 41.6     Platelets 102 (A)     Hemoglobin A1C   5.6   (A) Abnormal value       Comments are available for some flowsheets but are not being displayed.         Fasting lipids from 4/9/2019 reveals triglycerides 76, LDL 89 and HDL 47            Assessment and Plan    1. Atrial fibrillation, unspecified type (HCC)  Stable    2. Long term current use of anticoagulant therapy  Well-tolerated    3. Essential hypertension  Controlled    4. Hyperlipidemia, unspecified hyperlipidemia type  Controlled    5. Chronic obstructive pulmonary disease, unspecified COPD type (HCC)  Stable    Tamar feels well and denies chest discomfort or any progressive respiratory insufficiency.  She tolerates her medications well.  Her blood pressure is well controlled.  I will see her in follow-up in 6 months.  She will continue to observe a low-cholesterol low saturated fat diet and I have encouraged her to remain as active aerobically as possible.        Follow Up   No follow-ups on file.  Patient was given instructions and counseling regarding her condition or for health maintenance advice. Please see specific information pulled into the AVS if appropriate.

## 2021-10-26 ENCOUNTER — OFFICE VISIT (OUTPATIENT)
Dept: CARDIOLOGY | Facility: CLINIC | Age: 86
End: 2021-10-26

## 2021-10-26 VITALS
WEIGHT: 123 LBS | OXYGEN SATURATION: 98 % | SYSTOLIC BLOOD PRESSURE: 112 MMHG | HEART RATE: 60 BPM | BODY MASS INDEX: 20.49 KG/M2 | HEIGHT: 65 IN | RESPIRATION RATE: 20 BRPM | DIASTOLIC BLOOD PRESSURE: 68 MMHG

## 2021-10-26 DIAGNOSIS — E78.5 HYPERLIPIDEMIA, UNSPECIFIED HYPERLIPIDEMIA TYPE: ICD-10-CM

## 2021-10-26 DIAGNOSIS — I10 ESSENTIAL HYPERTENSION: ICD-10-CM

## 2021-10-26 DIAGNOSIS — I48.91 ATRIAL FIBRILLATION, UNSPECIFIED TYPE (HCC): Primary | ICD-10-CM

## 2021-10-26 DIAGNOSIS — J44.9 CHRONIC OBSTRUCTIVE PULMONARY DISEASE, UNSPECIFIED COPD TYPE (HCC): ICD-10-CM

## 2021-10-26 DIAGNOSIS — Z79.01 LONG TERM CURRENT USE OF ANTICOAGULANT THERAPY: ICD-10-CM

## 2021-10-26 PROCEDURE — 99214 OFFICE O/P EST MOD 30 MIN: CPT | Performed by: INTERNAL MEDICINE

## 2021-10-26 RX ORDER — DIPHENHYDRAMINE HCL 25 MG
25 CAPSULE ORAL AS NEEDED
COMMUNITY

## 2022-04-27 ENCOUNTER — OFFICE VISIT (OUTPATIENT)
Dept: CARDIOLOGY | Facility: CLINIC | Age: 87
End: 2022-04-27

## 2022-04-27 VITALS
HEART RATE: 62 BPM | DIASTOLIC BLOOD PRESSURE: 88 MMHG | HEIGHT: 65 IN | BODY MASS INDEX: 19.83 KG/M2 | SYSTOLIC BLOOD PRESSURE: 142 MMHG | WEIGHT: 119 LBS

## 2022-04-27 DIAGNOSIS — E78.5 HYPERLIPIDEMIA, UNSPECIFIED HYPERLIPIDEMIA TYPE: ICD-10-CM

## 2022-04-27 DIAGNOSIS — R06.09 DOE (DYSPNEA ON EXERTION): ICD-10-CM

## 2022-04-27 DIAGNOSIS — R09.89 OTHER SPECIFIED SYMPTOMS AND SIGNS INVOLVING THE CIRCULATORY AND RESPIRATORY SYSTEMS: ICD-10-CM

## 2022-04-27 DIAGNOSIS — J44.9 CHRONIC OBSTRUCTIVE PULMONARY DISEASE, UNSPECIFIED COPD TYPE: ICD-10-CM

## 2022-04-27 DIAGNOSIS — I48.91 ATRIAL FIBRILLATION, UNSPECIFIED TYPE: Primary | ICD-10-CM

## 2022-04-27 DIAGNOSIS — Z79.01 LONG TERM CURRENT USE OF ANTICOAGULANT THERAPY: ICD-10-CM

## 2022-04-27 DIAGNOSIS — I10 PRIMARY HYPERTENSION: ICD-10-CM

## 2022-04-27 DIAGNOSIS — R60.0 BILATERAL LEG EDEMA: ICD-10-CM

## 2022-04-27 PROCEDURE — 99215 OFFICE O/P EST HI 40 MIN: CPT | Performed by: INTERNAL MEDICINE

## 2022-04-27 RX ORDER — METOPROLOL TARTRATE 50 MG/1
50 TABLET, FILM COATED ORAL 2 TIMES DAILY
Qty: 60 TABLET | Refills: 11 | Status: SHIPPED | OUTPATIENT
Start: 2022-04-27

## 2022-04-27 NOTE — PROGRESS NOTES
"Chief Complaint  Follow-up    Subjective    History of Present Illness      I saw Tamar Steve today for cardiovascular care.  She is a very pleasant 89-year-old female with known COPD and progressive respiratory insufficiency.  She does report increased dyspnea on exertion but is able to sleep with 1 pillow free of orthopnea or PND.  She does have bilateral lower extremity edema and discoloration.  She denies chest pain pressure or tightness.  She is free of syncope near syncope or significant palpitations.  She remains on long-term anticoagulation with Eliquis at 2.5 mg twice daily with a history of atrial fibrillation.  Echocardiogram from 3/1/2019 reveals left ventricular ejection fraction 56%, mild concentric left ventricular hypertrophy, aortic valve sclerosis without clear stenosis, mild mitral and tricuspid regurgitation.  Right ventricular systolic pressure was 48 mmHg.    Objective   Vital Signs:   /88   Pulse 62   Ht 165.1 cm (65\")   Wt 54 kg (119 lb)   BMI 19.80 kg/m²     Constitutional:       Appearance: Well-developed.   Eyes:      Conjunctiva/sclera: Conjunctivae normal.      Pupils: Pupils are equal, round, and reactive to light.   HENT:      Head: Normocephalic and atraumatic.   Neck:      Thyroid: No thyromegaly.   Pulmonary:      Effort: Pulmonary effort is normal. No respiratory distress.      Breath sounds: Examination of the right-middle field reveals decreased breath sounds. Examination of the left-middle field reveals decreased breath sounds. Examination of the right-lower field reveals decreased breath sounds and rhonchi. Examination of the left-lower field reveals decreased breath sounds and rhonchi. Decreased breath sounds present. No wheezing. Rhonchi present. No rales.   Cardiovascular:      Normal rate. Regular rhythm.      No gallop. No S3 and S4 gallop. No rub.   Pulses:     Decreased pulses.      Dorsalis pedis: 1+ bilaterally.  Edema:     Peripheral edema present.    "  Pretibial: bilateral trace edema of the pretibial area.     Ankle: bilateral trace edema of the ankle.     Feet: bilateral trace edema of the feet.  Abdominal:      General: Bowel sounds are normal. There is no distension.      Palpations: Abdomen is soft. There is no abdominal mass.      Tenderness: There is no abdominal tenderness.   Musculoskeletal: Normal range of motion.      Cervical back: Neck supple. Skin:     General: Skin is warm and dry.      Findings: No erythema.   Neurological:      Mental Status: Alert and oriented to person, place, and time.         Result Review :     Common labs    Common Labsle 6/8/21 6/8/21 6/8/21 11/10/21 11/10/21    1002 1002 1002 1204 1204   Glucose  91  123 (A)    BUN  21 (A)  22 (A)    Creatinine  1.0  0.8    Sodium  137  133 (A)    Potassium  4.8  4.8    Chloride  100  97 (A)    Calcium  9.4  9.3    Albumin  4.3  4.0    Total Bilirubin  0.6  0.7    Alkaline Phosphatase  73  82    AST (SGOT)  30  25    ALT (SGPT)  23  22    WBC 8.87    7.12   Hemoglobin 13.8    12.9   Hematocrit 41.6    40.4   Platelets 102 (A)    185   Hemoglobin A1C   5.6     (A) Abnormal value       Comments are available for some flowsheets but are not being displayed.                     Assessment and Plan    1. Atrial fibrillation, unspecified type (HCC)  Stable  - Adult Transthoracic Echo Complete W/ Color, Spectral and Contrast if Necessary Per Protocol; Future  - Doppler Arterial Multi Level Lower Extremity - Bilateral CAR; Future    2. Long term current use of anticoagulant therapy  Well-tolerated    3. Primary hypertension  Adequately controlled  - Adult Transthoracic Echo Complete W/ Color, Spectral and Contrast if Necessary Per Protocol; Future  - metoprolol tartrate (LOPRESSOR) 50 MG tablet; Take 1 tablet by mouth 2 (Two) Times a Day.  Dispense: 60 tablet; Refill: 11    4. Hyperlipidemia, unspecified hyperlipidemia type  Stable    5. Chronic obstructive pulmonary disease, unspecified COPD  type (HCC)  Progressive respiratory insufficiency    6. Bilateral leg edema  Mild but persistent  - Doppler Arterial Multi Level Lower Extremity - Bilateral CAR; Future    7. FRANKLIN (dyspnea on exertion)  Progressive  - Adult Transthoracic Echo Complete W/ Color, Spectral and Contrast if Necessary Per Protocol; Future  - Doppler Arterial Multi Level Lower Extremity - Bilateral CAR; Future    8. Other specified symptoms and signs involving the circulatory and respiratory systems   Bilateral lower extremity discoloration and decreased pulses  - Doppler Arterial Multi Level Lower Extremity - Bilateral CAR; Future    Tamar denies chest pain pressure tightness but does report definite progressive dyspnea on exertion.  I will obtain an echocardiogram.  I will reduce her metoprolol to tartrate from 100 mg twice a day to 50 mg twice a day.  I will obtain bilateral lower extremity arterial Doppler study.  I have encouraged her to keep her lower extremities elevated while seated.  She is demonstrated mild pulmonary hypertension on previous echocardiogram.  We will reassess.  I will plan to see her in follow-up in 1 month or sooner      I spent 50 minutes caring for Tamar on this date of service. This time includes time spent by me in the following activities:preparing for the visit, performing a medically appropriate examination and/or evaluation , counseling and educating the patient/family/caregiver, ordering medications, tests, or procedures, documenting information in the medical record, independently interpreting results and communicating that information with the patient/family/caregiver and care coordination  Follow Up   No follow-ups on file.  Patient was given instructions and counseling regarding her condition or for health maintenance advice. Please see specific information pulled into the AVS if appropriate.

## 2022-05-06 ENCOUNTER — HOSPITAL ENCOUNTER (OUTPATIENT)
Dept: CARDIOLOGY | Facility: HOSPITAL | Age: 87
Discharge: HOME | End: 2022-05-06

## 2022-05-06 VITALS
SYSTOLIC BLOOD PRESSURE: 149 MMHG | HEIGHT: 65 IN | BODY MASS INDEX: 19.83 KG/M2 | WEIGHT: 119 LBS | DIASTOLIC BLOOD PRESSURE: 81 MMHG | HEART RATE: 81 BPM

## 2022-05-06 DIAGNOSIS — R06.09 DOE (DYSPNEA ON EXERTION): ICD-10-CM

## 2022-05-06 DIAGNOSIS — R60.0 BILATERAL LEG EDEMA: ICD-10-CM

## 2022-05-06 DIAGNOSIS — I48.91 ATRIAL FIBRILLATION, UNSPECIFIED TYPE: ICD-10-CM

## 2022-05-06 DIAGNOSIS — R09.89 OTHER SPECIFIED SYMPTOMS AND SIGNS INVOLVING THE CIRCULATORY AND RESPIRATORY SYSTEMS: ICD-10-CM

## 2022-05-06 DIAGNOSIS — I10 PRIMARY HYPERTENSION: ICD-10-CM

## 2022-05-06 LAB
BH CV ECHO MEAS - ACS: 1.52 CM
BH CV ECHO MEAS - AO MAX PG: 3.8 MMHG
BH CV ECHO MEAS - AO MEAN PG: 2.31 MMHG
BH CV ECHO MEAS - AO ROOT DIAM: 3.1 CM
BH CV ECHO MEAS - AO V2 MAX: 97 CM/SEC
BH CV ECHO MEAS - AO V2 VTI: 23.6 CM
BH CV ECHO MEAS - AVA(I,D): 2.41 CM2
BH CV ECHO MEAS - EDV(CUBED): 41.2 ML
BH CV ECHO MEAS - EDV(MOD-SP2): 25 ML
BH CV ECHO MEAS - EDV(MOD-SP4): 27 ML
BH CV ECHO MEAS - EF(MOD-BP): 62.6 %
BH CV ECHO MEAS - EF(MOD-SP2): 56 %
BH CV ECHO MEAS - EF(MOD-SP4): 63 %
BH CV ECHO MEAS - ESV(CUBED): 12.6 ML
BH CV ECHO MEAS - ESV(MOD-SP2): 11 ML
BH CV ECHO MEAS - ESV(MOD-SP4): 10 ML
BH CV ECHO MEAS - FS: 32.7 %
BH CV ECHO MEAS - IVS/LVPW: 1.22 CM
BH CV ECHO MEAS - IVSD: 1.54 CM
BH CV ECHO MEAS - LA DIMENSION: 3.3 CM
BH CV ECHO MEAS - LAT PEAK E' VEL: 6.4 CM/SEC
BH CV ECHO MEAS - LV DIASTOLIC VOL/BSA (35-75): 17 CM2
BH CV ECHO MEAS - LV MASS(C)D: 170.6 GRAMS
BH CV ECHO MEAS - LV MAX PG: 1.9 MMHG
BH CV ECHO MEAS - LV MEAN PG: 0.96 MMHG
BH CV ECHO MEAS - LV SYSTOLIC VOL/BSA (12-30): 6.3 CM2
BH CV ECHO MEAS - LV V1 MAX: 69 CM/SEC
BH CV ECHO MEAS - LV V1 VTI: 17.7 CM
BH CV ECHO MEAS - LVIDD: 3.5 CM
BH CV ECHO MEAS - LVIDS: 2.33 CM
BH CV ECHO MEAS - LVOT AREA: 3.2 CM2
BH CV ECHO MEAS - LVOT DIAM: 2.02 CM
BH CV ECHO MEAS - LVPWD: 1.26 CM
BH CV ECHO MEAS - MED PEAK E' VEL: 5.5 CM/SEC
BH CV ECHO MEAS - MR MAX PG: 65.9 MMHG
BH CV ECHO MEAS - MR MAX VEL: 406 CM/SEC
BH CV ECHO MEAS - MV A MAX VEL: 66.4 CM/SEC
BH CV ECHO MEAS - MV DEC SLOPE: 839.9 CM/SEC2
BH CV ECHO MEAS - MV DEC TIME: 135 MSEC
BH CV ECHO MEAS - MV E MAX VEL: 113 CM/SEC
BH CV ECHO MEAS - MV E/A: 1.7
BH CV ECHO MEAS - MV MAX PG: 5.8 MMHG
BH CV ECHO MEAS - MV MEAN PG: 1.64 MMHG
BH CV ECHO MEAS - MV P1/2T: 44.9 MSEC
BH CV ECHO MEAS - MV V2 VTI: 24.6 CM
BH CV ECHO MEAS - MVA(P1/2T): 4.9 CM2
BH CV ECHO MEAS - MVA(VTI): 2.32 CM2
BH CV ECHO MEAS - PA ACC TIME: 0.07 SEC
BH CV ECHO MEAS - PA PR(ACCEL): 46.9 MMHG
BH CV ECHO MEAS - PA V2 MAX: 73.5 CM/SEC
BH CV ECHO MEAS - PI END-D VEL: 203.8 CM/SEC
BH CV ECHO MEAS - RAP SYSTOLE: 3 MMHG
BH CV ECHO MEAS - RV MAX PG: 0.9 MMHG
BH CV ECHO MEAS - RV V1 MAX: 47.6 CM/SEC
BH CV ECHO MEAS - RV V1 VTI: 10.1 CM
BH CV ECHO MEAS - RVSP: 82 MMHG
BH CV ECHO MEAS - SI(MOD-SP2): 8.8 ML/M2
BH CV ECHO MEAS - SI(MOD-SP4): 10.7 ML/M2
BH CV ECHO MEAS - SV(LVOT): 57 ML
BH CV ECHO MEAS - SV(MOD-SP2): 14 ML
BH CV ECHO MEAS - SV(MOD-SP4): 17 ML
BH CV ECHO MEAS - TAPSE (>1.6): 1.3 CM
BH CV ECHO MEAS - TR MAX PG: 79 MMHG
BH CV ECHO MEAS - TR MAX VEL: 444.4 CM/SEC
BH CV ECHO MEASUREMENTS AVERAGE E/E' RATIO: 18.99
BH CV LOWER ARTERIAL LEFT ABI RATIO: 0.92
BH CV LOWER ARTERIAL LEFT DORSALIS PEDIS SYS MAX: 131
BH CV LOWER ARTERIAL LEFT POST TIBIAL SYS MAX: 143
BH CV LOWER ARTERIAL LEFT TBI RATIO: 0.71
BH CV LOWER ARTERIAL RIGHT ABI RATIO: 0.96
BH CV LOWER ARTERIAL RIGHT DORSALIS PEDIS SYS MAX: 149
BH CV LOWER ARTERIAL RIGHT POST TIBIAL SYS MAX: 134
BH CV LOWER ARTERIAL RIGHT TBI RATIO: 0.83
BH CV XLRA - RV BASE: 2.7 CM
BH CV XLRA - RV LENGTH: 5 CM
BH CV XLRA - RV MID: 2.14 CM
BH CV XLRA - TDI S': 10.1 CM/SEC
LEFT ATRIUM VOLUME INDEX: 20.4 ML/M2
MAXIMAL PREDICTED HEART RATE: 131 BPM
MAXIMAL PREDICTED HEART RATE: 131 BPM
SINUS: 3 CM
STJ: 3.1 CM
STRESS TARGET HR: 111 BPM
STRESS TARGET HR: 111 BPM
UPPER ARTERIAL LEFT ARM BRACHIAL SYS MAX: 147
UPPER ARTERIAL RIGHT ARM BRACHIAL SYS MAX: 155

## 2022-05-06 PROCEDURE — 93306 TTE W/DOPPLER COMPLETE: CPT

## 2022-05-06 PROCEDURE — 93922 UPR/L XTREMITY ART 2 LEVELS: CPT

## 2022-05-06 PROCEDURE — 93306 TTE W/DOPPLER COMPLETE: CPT | Performed by: INTERNAL MEDICINE

## 2022-06-01 ENCOUNTER — OFFICE VISIT (OUTPATIENT)
Dept: CARDIOLOGY | Facility: CLINIC | Age: 87
End: 2022-06-01

## 2022-06-01 VITALS
HEIGHT: 65 IN | WEIGHT: 114 LBS | HEART RATE: 68 BPM | DIASTOLIC BLOOD PRESSURE: 68 MMHG | SYSTOLIC BLOOD PRESSURE: 124 MMHG | BODY MASS INDEX: 18.99 KG/M2

## 2022-06-01 DIAGNOSIS — Z79.01 LONG TERM CURRENT USE OF ANTICOAGULANT THERAPY: ICD-10-CM

## 2022-06-01 DIAGNOSIS — I27.20 PULMONARY HYPERTENSION: ICD-10-CM

## 2022-06-01 DIAGNOSIS — E78.5 HYPERLIPIDEMIA, UNSPECIFIED HYPERLIPIDEMIA TYPE: ICD-10-CM

## 2022-06-01 DIAGNOSIS — I10 PRIMARY HYPERTENSION: ICD-10-CM

## 2022-06-01 DIAGNOSIS — I48.91 ATRIAL FIBRILLATION, UNSPECIFIED TYPE: Primary | ICD-10-CM

## 2022-06-01 DIAGNOSIS — J44.9 CHRONIC OBSTRUCTIVE PULMONARY DISEASE, UNSPECIFIED COPD TYPE: ICD-10-CM

## 2022-06-01 PROCEDURE — 99215 OFFICE O/P EST HI 40 MIN: CPT | Performed by: INTERNAL MEDICINE

## 2022-08-09 NOTE — PROGRESS NOTES
"Chief Complaint  No chief complaint on file.    Subjective    History of Present Illness      I saw Tamar Zheng today for cardiovascular care.  She is a pleasant 89-year-old female.  She has a history of COPD and moderate pulmonary hypertension.  Her respiratory status is remained stable.  She has her baseline dyspnea on exertion which has not progressed.  She is free of chest pain pressure or tightness.  She does not report orthopnea or PND no syncope near syncope or palpitations.  She has history of atrial fibrillation remains on long-term anticoagulation with Eliquis 2.5 mg twice daily.  She tolerates her anticoagulation well no significant bruising or bleeding.  Her lower extremity edema is persistent but not progressive.  She drinks boost to supplement her caloric intake.    Objective   Vital Signs:   /64   Pulse 72   Ht 165.1 cm (65\")   Wt 50.8 kg (112 lb)   BMI 18.64 kg/m²     Constitutional:       Appearance: Well-developed.   Eyes:      Conjunctiva/sclera: Conjunctivae normal.      Pupils: Pupils are equal, round, and reactive to light.   HENT:      Head: Normocephalic and atraumatic.   Neck:      Thyroid: No thyromegaly.   Pulmonary:      Effort: Pulmonary effort is normal. No respiratory distress.      Breath sounds: Decreased air movement present. No wheezing. No rales.   Cardiovascular:      Normal rate. Regular rhythm.      No gallop. No S3 and S4 gallop. No rub.   Edema:     Peripheral edema present.     Pretibial: bilateral trace edema of the pretibial area.     Ankle: bilateral trace edema of the ankle.     Feet: bilateral trace edema of the feet.  Abdominal:      General: Bowel sounds are normal. There is no distension.      Palpations: Abdomen is soft. There is no abdominal mass.      Tenderness: There is no abdominal tenderness.   Musculoskeletal: Normal range of motion.      Cervical back: Neck supple. Skin:     General: Skin is warm and dry.      Findings: No erythema. "   Neurological:      Mental Status: Alert and oriented to person, place, and time.     No significant change in today's physical exam  Result Review :     Common labs    Common Labsle 11/10/21 11/10/21 6/13/22 6/13/22    1204 1204 1111 1111   Glucose 123 (A)   74   BUN 22 (A)   25 (A)   Creatinine 0.8   0.86   Sodium 133 (A)   136   Potassium 4.8   4.6   Chloride 97 (A)   100   Calcium 9.3   8.9   Albumin 4.0   4.1   Total Bilirubin 0.7   0.7   Alkaline Phosphatase 82   102   AST (SGOT) 25   27   ALT (SGPT) 22   26   WBC  7.12 6.86    Hemoglobin  12.9 14.0    Hematocrit  40.4 42.6    Platelets  185 140    (A) Abnormal value                      Assessment and Plan    1. Atrial fibrillation, unspecified type (HCC)  Stable    2. Long term current use of anticoagulant therapy  Well-tolerated    3. Primary hypertension  Controlled    4. Hyperlipidemia, unspecified hyperlipidemia type  Controlled    5. Chronic obstructive pulmonary disease, unspecified COPD type (HCC)  Persistent but stable    6.  Moderate pulmonary hypertension    Tamar is free of chest discomfort and respiratory status is stable.  She tolerates her medications well.  She is to pursue a second opinion pulmonary evaluation with Dr. Martinez October 2022.  I will see her in follow-up in 3 months sooner if needed.        Follow Up   No follow-ups on file.  Patient was given instructions and counseling regarding her condition or for health maintenance advice. Please see specific information pulled into the AVS if appropriate.

## 2022-08-10 ENCOUNTER — OFFICE VISIT (OUTPATIENT)
Dept: CARDIOLOGY | Facility: CLINIC | Age: 87
End: 2022-08-10

## 2022-08-10 VITALS
BODY MASS INDEX: 18.66 KG/M2 | HEIGHT: 65 IN | DIASTOLIC BLOOD PRESSURE: 64 MMHG | SYSTOLIC BLOOD PRESSURE: 106 MMHG | WEIGHT: 112 LBS | HEART RATE: 72 BPM

## 2022-08-10 DIAGNOSIS — I10 PRIMARY HYPERTENSION: ICD-10-CM

## 2022-08-10 DIAGNOSIS — J44.9 CHRONIC OBSTRUCTIVE PULMONARY DISEASE, UNSPECIFIED COPD TYPE: ICD-10-CM

## 2022-08-10 DIAGNOSIS — Z79.01 LONG TERM CURRENT USE OF ANTICOAGULANT THERAPY: ICD-10-CM

## 2022-08-10 DIAGNOSIS — E78.5 HYPERLIPIDEMIA, UNSPECIFIED HYPERLIPIDEMIA TYPE: ICD-10-CM

## 2022-08-10 DIAGNOSIS — I48.91 ATRIAL FIBRILLATION, UNSPECIFIED TYPE: Primary | ICD-10-CM

## 2022-08-10 PROCEDURE — 99214 OFFICE O/P EST MOD 30 MIN: CPT | Performed by: INTERNAL MEDICINE
